# Patient Record
Sex: FEMALE | Race: WHITE | NOT HISPANIC OR LATINO | URBAN - METROPOLITAN AREA
[De-identification: names, ages, dates, MRNs, and addresses within clinical notes are randomized per-mention and may not be internally consistent; named-entity substitution may affect disease eponyms.]

---

## 2020-12-18 ENCOUNTER — PREPPED CHART (OUTPATIENT)
Dept: URBAN - METROPOLITAN AREA CLINIC 28 | Facility: CLINIC | Age: 65
End: 2020-12-18

## 2021-09-02 NOTE — PATIENT DISCUSSION
Blepharitis instruction sheet given.
Monitor until patient is more symptomatic.
Monitor.
No prescription for glasses or contact lenses was given today.
No/Transportation needed. No/medical clearance needed. + Sept gtts.
Patient made aware of 24/7 emergency services.
Plan: OD first (only) CV tgt -2.00.
Recommend stopping plaquenil if not necessary.
The patient feels that the cataract is significantly impacting daily activities and has elected cataract surgery. The risks, benefits, and alternatives to surgery were discussed. The patient elects to proceed with surgery.
Will treat with:  warm compresses, lid scrubs with tea tree oil, and oasis tears plus.
pt. on Plaquenil.  will need monitoring with oct mac and hvf 10-2.
Family

## 2022-03-29 ASSESSMENT — TONOMETRY
OD_IOP_MMHG: 19
OS_IOP_MMHG: 19

## 2022-03-31 ENCOUNTER — ESTABLISHED PATIENT (OUTPATIENT)
Dept: URBAN - METROPOLITAN AREA CLINIC 28 | Facility: CLINIC | Age: 67
End: 2022-03-31

## 2022-03-31 DIAGNOSIS — H52.13: ICD-10-CM

## 2022-03-31 PROCEDURE — 92015H REFRACTION- HART EYE CARE

## 2022-03-31 PROCEDURE — 92014 COMPRE OPH EXAM EST PT 1/>: CPT

## 2022-03-31 PROCEDURE — 92310-1 LEVEL 1 CONTACT LENS MANAGEMENT

## 2022-03-31 ASSESSMENT — KERATOMETRY
OD_K2POWER_DIOPTERS: 43.25
OS_AXISANGLE2_DEGREES: 94
OD_AXISANGLE2_DEGREES: 83
OS_K2POWER_DIOPTERS: 44
OS_K1POWER_DIOPTERS: 43
OD_K1POWER_DIOPTERS: 42.75
OS_AXISANGLE_DEGREES: 4
OD_AXISANGLE_DEGREES: 173

## 2022-03-31 ASSESSMENT — VISUAL ACUITY
OU_SC: 20/70+1
OS_SC: 20/70
OD_SC: 20/300

## 2022-03-31 ASSESSMENT — TONOMETRY
OS_IOP_MMHG: 18
OD_IOP_MMHG: 16

## 2022-10-10 NOTE — PATIENT DISCUSSION
LVM that pt will need to be schedule for in-person OV to re-evaluate pt's condition to determine if MRI should be ordered, since the pt has not been seen since 11/29/21. Patient declines new glasses prescription and will continue with current lenses.

## 2023-06-20 ENCOUNTER — ESTABLISHED PATIENT (OUTPATIENT)
Dept: URBAN - METROPOLITAN AREA CLINIC 28 | Facility: CLINIC | Age: 68
End: 2023-06-20

## 2023-06-20 DIAGNOSIS — H52.13: ICD-10-CM

## 2023-06-20 DIAGNOSIS — Z46.0: ICD-10-CM

## 2023-06-20 PROCEDURE — 92014 COMPRE OPH EXAM EST PT 1/>: CPT

## 2023-06-20 PROCEDURE — 92015 DETERMINE REFRACTIVE STATE: CPT

## 2023-06-20 PROCEDURE — 92310-1 LEVEL 1 CONTACT LENS MANAGEMENT

## 2023-06-20 ASSESSMENT — KERATOMETRY
OD_AXISANGLE2_DEGREES: 83
OS_K2POWER_DIOPTERS: 44
OD_AXISANGLE_DEGREES: 173
OS_K1POWER_DIOPTERS: 43
OD_K2POWER_DIOPTERS: 43.25
OS_AXISANGLE_DEGREES: 4
OD_K1POWER_DIOPTERS: 42.75
OS_AXISANGLE2_DEGREES: 94

## 2023-06-20 ASSESSMENT — VISUAL ACUITY
OD_CC: 20/20
OS_SC: J1+ -1

## 2023-06-20 ASSESSMENT — TONOMETRY
OS_IOP_MMHG: 18
OD_IOP_MMHG: 17

## 2024-08-05 ENCOUNTER — COMPREHENSIVE EXAM (OUTPATIENT)
Dept: URBAN - METROPOLITAN AREA CLINIC 27 | Facility: CLINIC | Age: 69
End: 2024-08-05

## 2024-08-05 DIAGNOSIS — H52.13: ICD-10-CM

## 2024-08-05 PROCEDURE — 92015 DETERMINE REFRACTIVE STATE: CPT

## 2024-08-05 PROCEDURE — 92310-3 LEVEL 3 CONTACT LENS MANAGEMENT: Mod: 21

## 2024-08-05 PROCEDURE — 92250E RETINAL SCREENING, ELECTIVE

## 2024-08-05 PROCEDURE — 92014 COMPRE OPH EXAM EST PT 1/>: CPT

## 2024-08-05 PROCEDURE — 92499RS OCT RETINAL SCREENING, ELECTIVE

## 2024-08-05 ASSESSMENT — KERATOMETRY
OD_K2POWER_DIOPTERS: 43.25
OS_AXISANGLE2_DEGREES: 94
OS_K1POWER_DIOPTERS: 43
OD_AXISANGLE2_DEGREES: 83
OD_AXISANGLE_DEGREES: 173
OS_AXISANGLE_DEGREES: 4
OD_K1POWER_DIOPTERS: 42.75
OS_K2POWER_DIOPTERS: 44

## 2024-08-05 ASSESSMENT — VISUAL ACUITY
OD_CC: 20/20
OS_SC: J1+

## 2024-08-05 ASSESSMENT — TONOMETRY
OD_IOP_MMHG: 21
OS_IOP_MMHG: 20